# Patient Record
Sex: MALE | Race: WHITE | URBAN - METROPOLITAN AREA
[De-identification: names, ages, dates, MRNs, and addresses within clinical notes are randomized per-mention and may not be internally consistent; named-entity substitution may affect disease eponyms.]

---

## 2023-03-18 ENCOUNTER — OFFICE VISIT (OUTPATIENT)
Dept: URGENT CARE | Age: 28
End: 2023-03-18

## 2023-03-18 VITALS
DIASTOLIC BLOOD PRESSURE: 81 MMHG | OXYGEN SATURATION: 96 % | SYSTOLIC BLOOD PRESSURE: 118 MMHG | HEART RATE: 72 BPM | WEIGHT: 186.6 LBS | HEIGHT: 69 IN | TEMPERATURE: 97.7 F | BODY MASS INDEX: 27.64 KG/M2

## 2023-03-18 DIAGNOSIS — M10.9 GOUT OF BIG TOE: Primary | ICD-10-CM

## 2023-03-18 DIAGNOSIS — M79.674 GREAT TOE PAIN, RIGHT: ICD-10-CM

## 2023-03-18 PROBLEM — N41.9 PROSTATITIS: Status: ACTIVE | Noted: 2023-03-18

## 2023-03-18 PROBLEM — J30.2 SEASONAL ALLERGIES: Status: ACTIVE | Noted: 2023-03-18

## 2023-03-18 RX ORDER — METHYLPREDNISOLONE 4 MG/1
TABLET ORAL
Qty: 1 KIT | Refills: 0 | Status: SHIPPED | OUTPATIENT
Start: 2023-03-18 | End: 2023-03-24

## 2023-03-18 RX ORDER — CETIRIZINE HYDROCHLORIDE 10 MG/1
10 TABLET ORAL DAILY
COMMUNITY

## 2023-03-18 RX ORDER — TADALAFIL 5 MG/1
5 TABLET ORAL DAILY PRN
COMMUNITY

## 2023-03-18 RX ORDER — FLUTICASONE PROPIONATE 50 MCG
1 SPRAY, SUSPENSION (ML) NASAL DAILY
COMMUNITY

## 2023-03-18 NOTE — PATIENT INSTRUCTIONS
Xray prelim read no fracture will call with results  Take medication as prescribed for gout. Follow up in 7-14 days with PCP for lab work   New Prescriptions    METHYLPREDNISOLONE ([address]) 4 MG TABLET    Take by mouth.

## 2023-03-18 NOTE — PROGRESS NOTES
DILCIA Kunz (:  1995) is a 33 y.o. male,New patient, here for evaluation of the following chief complaint(s):  Toe Injury (6 months ago got a toe injury at work and dint do much about it. and swells up every now and then. Right toe. Possible fracture)      ASSESSMENT/PLAN:    ICD-10-CM    1. Gout of big toe  M10.9       2. Great toe pain, right  M79.674 XR FOOT RIGHT (MIN 3 VIEWS)     methylPREDNISolone (MEDROL DOSEPACK) 4 MG tablet          Xray prelim read no fracture will call with results  Take medication as prescribed for gout. Discussed dietary restrictions with patient. Follow up in 7-14 days with PCP for lab work and discussion about treatment if needed. XR FOOT RIGHT (MIN 3 VIEWS)   Final Result   1. No acute abnormality. SUBJECTIVE/OBJECTIVE:    History provided by:  Patient   used: No    HPI:   33 y.o. male presents with symptoms of great toe right foot injury 6 months ago, rolled over with a cart, now he is having right proximal metatarsal swelling of the right great toe ongoing since 2 weeks. Denies injury recently. Father has hx of gout. Has taken nothing except ice for symptoms minor help. Vitals:    23 1030   BP: 118/81   Pulse: 72   Temp: 97.7 °F (36.5 °C)   SpO2: 96%   Weight: 186 lb 9.6 oz (84.6 kg)   Height: 5' 9\" (1.753 m)       Review of Systems   Musculoskeletal:  Positive for joint swelling. All other systems reviewed and are negative. Physical Exam  Vitals and nursing note reviewed. Musculoskeletal:      Right foot: Decreased range of motion (of the great toe). No bunion. Feet:    Feet:      Comments: Right great toe mild dorsal proximal metatarsal erythema with +1 swelling and mild tenderness to touch. Slightly limited ROM to flexion of the right great toe. Pedis dorsalis pulse +2 no loss of sensation. An electronic signature was used to authenticate this note.     --Tay Gil, JIMMY - CNP

## 2023-03-18 NOTE — LETTER
March 18, 2023       Justin Nesbitt YOB: 1995   [address] [facility] ([place], [place]) [address]  [facility] [address] Date of Visit:  3/18/2023       To Whom It May Concern:    It is my medical opinion that Justin Nesbitt may return to work on 03/20/2023. Symptoms were present yesterday.    If you have any questions or concerns, please don't hesitate to call.    Sincerely,        Michi Sabillon, APRN - CNP

## 2024-10-12 ENCOUNTER — OFFICE VISIT (OUTPATIENT)
Dept: URGENT CARE | Age: 29
End: 2024-10-12

## 2024-10-12 VITALS
SYSTOLIC BLOOD PRESSURE: 126 MMHG | HEIGHT: 68 IN | TEMPERATURE: 98.4 F | WEIGHT: 180 LBS | HEART RATE: 85 BPM | BODY MASS INDEX: 27.28 KG/M2 | OXYGEN SATURATION: 98 % | DIASTOLIC BLOOD PRESSURE: 78 MMHG

## 2024-10-12 DIAGNOSIS — H69.93 DYSFUNCTION OF BOTH EUSTACHIAN TUBES: Primary | ICD-10-CM

## 2024-10-12 RX ORDER — AZELASTINE 1 MG/ML
1 SPRAY, METERED NASAL 2 TIMES DAILY
COMMUNITY

## 2024-10-12 RX ORDER — METHYLPREDNISOLONE 4 MG
TABLET, DOSE PACK ORAL
Qty: 1 KIT | Refills: 0 | Status: SHIPPED | OUTPATIENT
Start: 2024-10-12

## 2024-10-12 ASSESSMENT — VISUAL ACUITY: OU: 1

## 2024-10-12 NOTE — PROGRESS NOTES
Justin Nesbitt (: 1995) is a 29 y.o. male, Established patient, here for evaluation of the following chief complaint(s):  Otalgia (Left ear pain, was seen at the [facility] last week and was given prednisone, went away for a few days and now it is back.)      ASSESSMENT/PLAN:    ICD-10-CM    1. Dysfunction of both eustachian tubes  H69.93 methylPREDNISolone (MEDROL DOSEPACK) 4 MG tablet     [facility]          - Eustachian Tube Dysfunction:  Due to the left ear pain, left TM effusion with clear fluid, and bilateral TM retractions, along with previous history of similar symptoms bilaterally, suspect Eustachian tube dysfunction as cause of the pt's symptoms.   Low concern for otitis media or externa, retained foreign body, cerumen impaction, perforated TM, or mastoiditis.  Medrol Dose pack prescribed for steroid treatment of the Eustachian tube inflammation.  Recommended continued use of the pt's Flonase and OTC antihistamines.  Recommended OTC medication and home remedy treatments for symptomatic relief     Discussed PCP and/or ENT follow up for persisting or worsening symptoms, or to return to the clinic if unable to obtain PCP or ENT follow up for worsening symptoms.    The patient tolerated their visit well. The patient and/or the family were informed of the results of any tests, a time was given to answer questions, a plan was proposed and they agreed with plan. Reviewed AVS with treatment instructions and answered questions - pt/family expresses understanding and agreement with the discussed treatment plan and AVS instructions.      SUBJECTIVE/OBJECTIVE:  HPI:   29 y.o. male presents for complaint of possible ear infection x this morning.    Notes clogged ear and throat drainage over the last month. Was recently on Prednisone for a week - finished roughly 1 week ago. Notes waking up today with ear pain in the left ear.    Pt denies any history of past ear issues.     Admits

## 2024-10-12 NOTE — PATIENT INSTRUCTIONS
Medrol steroid pack prescribed for treatment of the Eustachian tube inflammation.  Continue taking your Flonase and your allergy medication to help with your symptoms.  Recommend OTC treatment for symptoms:  ibuprofen (Advil, Motrin) and acetaminophen (Tylenol) for ear pain.  decongestants (specifically pseudoephedrine) <avoid if you have a history of high blood pressure or heart conditions>, along with antihistamines (Claritin, Zyrtec, Allegra) and nasal steroid sprays (Flonase) to help with nasal congestion and runny nose.  warm teas, humidifiers, nasal lavages, and sleeping in an inclined position are also helpful options that can lessen symptoms.  Recommend warm compresses over the symptomatic ear(s) for 10-15 minutes, or a hot shower, followed by 1-2 minutes of massaging the area behind your ears and down the jaw-line to help with the ear congestion.    Follow up with your PCP within 7 days or, if unable, you can return to the clinic if symptoms persist beyond 7 days or if symptoms worsen.    New Prescriptions    METHYLPREDNISOLONE (MEDROL DOSEPACK) 4 MG TABLET    Take by mouth.

## 2024-10-22 ENCOUNTER — PROCEDURE VISIT (OUTPATIENT)
Dept: AUDIOLOGY | Age: 29
End: 2024-10-22
Payer: COMMERCIAL

## 2024-10-22 ENCOUNTER — OFFICE VISIT (OUTPATIENT)
Dept: ENT CLINIC | Age: 29
End: 2024-10-22
Payer: COMMERCIAL

## 2024-10-22 VITALS
DIASTOLIC BLOOD PRESSURE: 89 MMHG | BODY MASS INDEX: 25.76 KG/M2 | HEART RATE: 88 BPM | HEIGHT: 68 IN | SYSTOLIC BLOOD PRESSURE: 129 MMHG | WEIGHT: 170 LBS

## 2024-10-22 DIAGNOSIS — H92.02 OTALGIA OF LEFT EAR: Primary | ICD-10-CM

## 2024-10-22 DIAGNOSIS — Z01.10 ENCOUNTER FOR EXAM OF EARS AND HEARING W/O ABNORMAL FINDINGS: ICD-10-CM

## 2024-10-22 DIAGNOSIS — H93.8X2 SENSATION OF FULLNESS IN LEFT EAR: Primary | ICD-10-CM

## 2024-10-22 DIAGNOSIS — H93.8X2 EAR PRESSURE, LEFT: ICD-10-CM

## 2024-10-22 PROCEDURE — 92557 COMPREHENSIVE HEARING TEST: CPT | Performed by: AUDIOLOGIST

## 2024-10-22 PROCEDURE — 99203 OFFICE O/P NEW LOW 30 MIN: CPT | Performed by: OTOLARYNGOLOGY

## 2024-10-22 PROCEDURE — 92567 TYMPANOMETRY: CPT | Performed by: AUDIOLOGIST

## 2024-10-22 ASSESSMENT — ENCOUNTER SYMPTOMS
SORE THROAT: 0
EYE ITCHING: 0
APNEA: 0
TROUBLE SWALLOWING: 0
FACIAL SWELLING: 0
SINUS PRESSURE: 0
SHORTNESS OF BREATH: 0
COUGH: 0
VOICE CHANGE: 0

## 2024-10-22 NOTE — PROGRESS NOTES
pressure, sneezing, sore throat, tinnitus, trouble swallowing and voice change.    Eyes:  Negative for itching.   Respiratory:  Negative for apnea, cough and shortness of breath.    Endocrine: Negative for cold intolerance and heat intolerance.   Musculoskeletal:  Negative for myalgias and neck pain.   Skin:  Negative for rash.   Allergic/Immunologic: Negative for environmental allergies.   Neurological:  Negative for dizziness and headaches.   Psychiatric/Behavioral:  Negative for confusion, decreased concentration and sleep disturbance.          PhysicalExam     Vitals:    10/22/24 1333   BP: 129/89   Pulse: 88   Weight: 77.1 kg (170 lb)   Height: 1.727 m (5' 8\")       Physical Exam  Constitutional:       General: He is not in acute distress.     Appearance: He is well-developed.   HENT:      Head: Normocephalic and atraumatic.      Jaw: Malocclusion (subluxation with opening) present.      Right Ear: Tympanic membrane, ear canal and external ear normal. No drainage. No middle ear effusion. Tympanic membrane is not bulging. Tympanic membrane has normal mobility.      Left Ear: Tympanic membrane, ear canal and external ear normal. No drainage.  No middle ear effusion. Tympanic membrane is not bulging. Tympanic membrane has normal mobility.      Nose: No mucosal edema or rhinorrhea.      Mouth/Throat:      Lips: Pink.      Mouth: Mucous membranes are moist.      Tongue: No lesions.      Palate: No mass.      Pharynx: Uvula midline.   Eyes:      Pupils: Pupils are equal, round, and reactive to light.   Neck:      Thyroid: No thyroid mass or thyromegaly.      Trachea: Trachea and phonation normal.   Cardiovascular:      Pulses: Normal pulses.   Pulmonary:      Effort: Pulmonary effort is normal. No accessory muscle usage or respiratory distress.      Breath sounds: No stridor.   Musculoskeletal:      Cervical back: Full passive range of motion without pain.   Lymphadenopathy:      Head:      Right side of head: No

## 2024-10-22 NOTE — PROGRESS NOTES
Justin Nesbitt   1995, 29 y.o. male   [id number]       Referring Provider: Marie Patton DO  Referral Type: In an order in Epic    Reason for Visit: Evaluation of the cause of disorders of hearing, tinnitus, or balance.    ADULT AUDIOLOGIC EVALUATION      Justin Nesbitt is a 29 y.o. male seen today, 10/22/2024 , for an initial audiologic evaluation.  Patient was seen by Marie Patton DO following today's evaluation. Patient was alone.    AUDIOLOGIC AND OTHER PERTINENT MEDICAL HISTORY:      Justin Nesbitt noted otalgia, aural fullness, and dizziness.  Patient reports left ear pain and pressure for the past two months.  He noted he was on two rounds of oral steroids without lasting improvement.  Patient has experienced a dizziness sensation when bending over.    Justin Nesbitt denied tinnitus, history of occupational/recreational noise exposure, history of head trauma, history of ear surgery, and family history of hearing loss.    Date: 10/22/2024     IMPRESSIONS:      Normal middle ear pressure and compliance, bilaterally.  Normal hearing sensitivity with excellent word understanding at normal conversation level, bilaterally. Follow medical recommendations of Marie Patton DO.     ASSESSMENT AND FINDINGS:     Otoscopy revealed: Clear ear canals bilaterally    RIGHT EAR:  Hearing Sensitivity:Hearing sensitivity within normal limits from 250-8000 Hz  Speech Recognition Threshold: 5 dB HL  Word Recognition:Excellent (90%), based on NU-6 Ordered-by-Difficulty 10-word list at 45 dBHL using recorded speech stimuli.    Tympanometry: Normal peak pressure and compliance, Type A tympanogram, consistent with normal middle ear function.      LEFT EAR:  Hearing Sensitivity:Hearing sensitivity within normal limits from 250-8000 Hz  Speech Recognition Threshold: 5 dB HL  Word Recognition: Excellent (100%), based on NU-6 Ordered-by-Difficulty 10-word list at 45 dBHL using recorded speech stimuli.    Tympanometry: Normal peak

## 2025-08-29 ENCOUNTER — OFFICE VISIT (OUTPATIENT)
Dept: URGENT CARE | Age: 30
End: 2025-08-29

## 2025-08-29 VITALS
WEIGHT: 164.4 LBS | TEMPERATURE: 98 F | HEART RATE: 79 BPM | SYSTOLIC BLOOD PRESSURE: 104 MMHG | BODY MASS INDEX: 25 KG/M2 | OXYGEN SATURATION: 99 % | DIASTOLIC BLOOD PRESSURE: 76 MMHG

## 2025-08-29 DIAGNOSIS — R21 LOCALIZED RASH: ICD-10-CM

## 2025-08-29 DIAGNOSIS — S90.562A INSECT BITE OF LEFT ANKLE, INITIAL ENCOUNTER: Primary | ICD-10-CM
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
## 2025-08-29 RX ORDER — MULTIVITAMIN
1 TABLET ORAL DAILY
COMMUNITY

## 2025-08-29 RX ORDER — HYDROCORTISONE 25 MG/G
OINTMENT TOPICAL
Qty: 20 G | Refills: 0 | Status: SHIPPED | OUTPATIENT
Start: 2025-08-29 | End: 2025-09-05

## 2025-08-29 ASSESSMENT — ENCOUNTER SYMPTOMS
EYE REDNESS: 0
EYE PAIN: 0
VOMITING: 0
EYE DISCHARGE: 0
SHORTNESS OF BREATH: 0
NAUSEA: 0
DIARRHEA: 0
SORE THROAT: 0
ABDOMINAL PAIN: 0
COUGH: 0